# Patient Record
Sex: FEMALE | Race: AMERICAN INDIAN OR ALASKA NATIVE
[De-identification: names, ages, dates, MRNs, and addresses within clinical notes are randomized per-mention and may not be internally consistent; named-entity substitution may affect disease eponyms.]

---

## 2017-08-19 ENCOUNTER — HOSPITAL ENCOUNTER (EMERGENCY)
Dept: HOSPITAL 5 - ED | Age: 27
Discharge: HOME | End: 2017-08-19
Payer: SELF-PAY

## 2017-08-19 VITALS — SYSTOLIC BLOOD PRESSURE: 124 MMHG | DIASTOLIC BLOOD PRESSURE: 82 MMHG

## 2017-08-19 DIAGNOSIS — D25.9: Primary | ICD-10-CM

## 2017-08-19 LAB
BASOPHILS NFR BLD AUTO: 0.4 % (ref 0–1.8)
BILIRUB UR QL STRIP: (no result)
BLOOD UR QL VISUAL: (no result)
EOSINOPHIL NFR BLD AUTO: 0.9 % (ref 0–4.3)
HCT VFR BLD CALC: 37.8 % (ref 30.3–42.9)
HGB BLD-MCNC: 11.8 GM/DL (ref 10.1–14.3)
KETONES UR STRIP-MCNC: (no result) MG/DL
LEUKOCYTE ESTERASE UR QL STRIP: (no result)
MCH RBC QN AUTO: 25 PG (ref 28–32)
MCHC RBC AUTO-ENTMCNC: 31 % (ref 30–34)
MCV RBC AUTO: 80 FL (ref 79–97)
MUCOUS THREADS #/AREA URNS HPF: (no result) /HPF
NITRITE UR QL STRIP: (no result)
PH UR STRIP: 5 [PH] (ref 5–7)
PLATELET # BLD: 394 K/MM3 (ref 140–440)
PROT UR STRIP-MCNC: (no result) MG/DL
RBC # BLD AUTO: 4.73 M/MM3 (ref 3.65–5.03)
RBC #/AREA URNS HPF: 166 /HPF (ref 0–6)
UROBILINOGEN UR-MCNC: 2 MG/DL (ref ?–2)
WBC # BLD AUTO: 8.5 K/MM3 (ref 4.5–11)
WBC #/AREA URNS HPF: 1 /HPF (ref 0–6)

## 2017-08-19 PROCEDURE — 86901 BLOOD TYPING SEROLOGIC RH(D): CPT

## 2017-08-19 PROCEDURE — 86850 RBC ANTIBODY SCREEN: CPT

## 2017-08-19 PROCEDURE — 87210 SMEAR WET MOUNT SALINE/INK: CPT

## 2017-08-19 PROCEDURE — 76856 US EXAM PELVIC COMPLETE: CPT

## 2017-08-19 PROCEDURE — 81001 URINALYSIS AUTO W/SCOPE: CPT

## 2017-08-19 PROCEDURE — 36415 COLL VENOUS BLD VENIPUNCTURE: CPT

## 2017-08-19 PROCEDURE — 86900 BLOOD TYPING SEROLOGIC ABO: CPT

## 2017-08-19 PROCEDURE — 85025 COMPLETE CBC W/AUTO DIFF WBC: CPT

## 2017-08-19 PROCEDURE — 84702 CHORIONIC GONADOTROPIN TEST: CPT

## 2017-08-19 PROCEDURE — 87591 N.GONORRHOEAE DNA AMP PROB: CPT

## 2017-08-19 PROCEDURE — 76830 TRANSVAGINAL US NON-OB: CPT

## 2017-08-19 NOTE — ULTRASOUND REPORT
FINAL REPORT



PROCEDURE:  US TRANSVAGINAL



TECHNIQUE:  Real-time transvaginal sonography in multiple planes

of the pelvis was performed with image documentation. This

examination was performed without Doppler. Vascular

abnormalities, including ovarian torsion, will not be detectable

without Doppler evaluation. CPT 11912







HISTORY:  heavy vaginal bleeding 



COMPARISON:  No prior studies are available for comparison.



FINDINGS:  

UTERUS



Size: 7.5 x 4.4 x 6.0 centimeters cm.



Endometrial thickness: 17 mm.



Orientation: anteverted.



Cervix: Normal.



Fibroids/masses: 3 hypoechoic focal lesions are identified

largest measuring 2.7 centimeters located anteriorly.



RIGHT Ovary: 3.4 x 2.3 x 2.0 cm. 



Appearance: Multiple follicles are identified along the

periphery.



LEFT Ovary: 2.7 x 2.5 x 1.6 cm.



Appearance: It is suboptimally visualized due to poor window.

Left ovary also appears to contain multiple follicles along the

periphery.



Pelvic fluid: None.



Other: None.



IMPRESSION:  

Three hypoechoic lesions in the uterus consistent with fibroids.



Bilateral ovaries demonstrate multiple follicles along the

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

Clinical correlation is recommended.

## 2017-08-19 NOTE — ULTRASOUND REPORT
FINAL REPORT



PROCEDURE:  US PELVIC COMPLETE



TECHNIQUE:  Real-time transabdominal sonography in multiple

planes of pelvis was performed with image documentation. This

examination was performed without Doppler. Vascular

abnormalities, including ovarian torsion, will not be detectable

without Doppler evaluation. CPT 32563







HISTORY:  heavy vaginal bleeding 



COMPARISON:  No prior studies are available for comparison.



FINDINGS:  

UTERUS



Size: 7.5 x 4.4 x 6.0 centimeters cm.



Endometrial thickness: 17 mm.



Orientation: anteverted.



Cervix: Normal.



Fibroids/masses: 3 hypoechoic focal lesions are identified

largest measuring 2.7 centimeters located anteriorly.



RIGHT Ovary: 3.4 x 2.3 x 2.0 cm. 



Appearance: Multiple follicles are identified along the

periphery.



LEFT Ovary: 2.7 x 2.5 x 1.6 cm.



Appearance: It is suboptimally visualized due to poor window.

Left ovary also appears to contain multiple follicles along the

periphery.



Pelvic fluid: None.



Other: None.



IMPRESSION:  

Multiple focal lesions in the uterus are consistent with fibroids

largest measuring 2.7 centimeters in diameter.



Multiple ovarian follicles are identified along periphery of the

ovaries

## 2017-08-20 NOTE — EMERGENCY DEPARTMENT REPORT
Entered by CHEYENNE ANDRES, acting as scribe for FENG LAI PA.





ED Female  HPI





- General


Chief complaint: Vaginal Bleeding


Stated complaint: BLOOD CLOTS


Time Seen by Provider: 17 20:05


Source: patient


Mode of arrival: Ambulatory


Limitations: No Limitations





- History of Present Illness


Initial comments: 





26 y/o female presents to the ED c/o vaginal bleeding with clots x 2-3 months. 

Denies pain, abdominal pain, fever, chills, nausea and vomiting. Patient states 

she uses approximately 5-8 pads per day on a heavy day and has not had a normal 

period in about 4 years.  Denies any fevers chills significant abdominal pain 

nausea or vomiting.  No recent vaginal ultrasound. No alleviating or 

aggravating factors. NKDA. No significant medical and surgical history.  States 

she does not currently have a dedicated OBGYn. , P:0.


MD Complaint: vaginal bleeding


Onset/Timin


-: month(s)


Improves with: none


Worsens with: none


Are you Pregnant Now?: No


Associated Symptoms: vaginal bleeding.  denies: abdominal pain, nausea/vomiting

, fever/chills





- Related Data


 Previous Rx's











 Medication  Instructions  Recorded  Last Taken  Type


 


Ferrous Sulfate [Feosol 325 MG tab] 325 mg PO QDAY #30 tablet 17 Unknown 

Rx











 Allergies











Allergy/AdvReac Type Severity Reaction Status Date / Time


 


No Known Allergies Allergy   Unverified 17 17:04














ED Review of Systems


Comment: All other systems reviewed and negative


Constitutional: denies: chills, fever


Gastrointestinal: denies: abdominal pain, nausea, vomiting


Genitourinary: abnormal menses, other (vaginal bleeding)





ED Past Medical Hx





- Past Medical History


Previous Medical History?: No





- Surgical History


Past Surgical History?: No





- Social History


Smoking Status: Never Smoker


Substance Use Type: None





- Medications


Home Medications: 


 Home Medications











 Medication  Instructions  Recorded  Confirmed  Last Taken  Type


 


Ferrous Sulfate [Feosol 325 MG tab] 325 mg PO QDAY #30 tablet 17  Unknown 

Rx














ED Physical Exam





- General


Limitations: No Limitations


General appearance: alert, in no apparent distress





- Head


Head exam: Present: atraumatic, normocephalic, normal inspection





- Eye


Eye exam: Present: normal appearance, PERRL, EOMI.  Absent: scleral icterus, 

conjunctival injection, nystagmus, periorbital swelling, periorbital tenderness


Pupils: Present: normal accommodation





- ENT


ENT exam: Present: normal exam, normal orophraynx, mucous membranes moist, TM's 

normal bilaterally, normal external ear exam





- Neck


Neck exam: Present: normal inspection, full ROM.  Absent: tenderness, 

meningismus, lymphadenopathy, thyromegaly





- Respiratory


Respiratory exam: Present: normal lung sounds bilaterally.  Absent: respiratory 

distress, wheezes, rales, rhonchi, stridor, chest wall tenderness, accessory 

muscle use, decreased breath sounds, prolonged expiratory





- Cardiovascular


Cardiovascular Exam: Present: regular rate, normal rhythm, normal heart sounds.

  Absent: bradycardia, tachycardia, irregular rhythm, systolic murmur, 

diastolic murmur, rubs, gallop





- GI/Abdominal


GI/Abdominal exam: Present: soft, normal bowel sounds.  Absent: distended, 

tenderness, guarding, rebound, rigid





- 


External exam: Present: bleeding


Speculum exam: Present: vaginal bleeding (bloody vaginal discharge, clots)





- Extremities Exam


Extremities exam: Present: normal inspection, full ROM, normal capillary 

refill.  Absent: tenderness, pedal edema, joint swelling, calf tenderness





- Back Exam


Back exam: Present: normal inspection, full ROM.  Absent: tenderness, CVA 

tenderness (R), CVA tenderness (L), muscle spasm, paraspinal tenderness, 

vertebral tenderness, rash noted





- Neurological Exam


Neurological exam: Present: alert, oriented X3, CN II-XII intact





- Psychiatric


Psychiatric exam: Present: normal affect, normal mood





- Skin


Skin exam: Present: warm, dry, intact, normal color.  Absent: rash





- Other


Other exam information: 


Chaparrita Andres present during external, speculum and bi-manual exam





ED Course


 Vital Signs











  17





  16:56 22:46


 


Temperature 98.7 F 


 


Pulse Rate 87 89


 


Respiratory 22 18





Rate  


 


Blood Pressure 159/99 


 


Blood Pressure  124/82





[Left]  


 


O2 Sat by Pulse 99 100





Oximetry  














ED Medical Decision Making





- Lab Data


Result diagrams: 


 17 17:10








- Medical Decision Making


A/P: Menorrhagia, vaginal bleeding, uterine fibroids


1-ultrasound demonstrates uterine fibroids.  Patient on patient's complaint of 

hirsutism likely patient has PCOS will refer her to primary care and OB/GYN


2-H&H within normal limits, pt states she has been bleeding for months. labs 

within normal limits, no arterial bleeding noted on pelvic exam


3-patient educated on symptoms of uterine fibroids and I recommended that she 

follow up with OB/GYN


4- will start patient empirically on ferrous sulfate supplementation





ED Disposition


Clinical Impression: 


 Vaginal bleeding





Fibroid (bleeding) (uterine)


Qualifiers:


 Uterine leiomyoma location: unspecified location Qualified Code(s): D25.9 - 

Leiomyoma of uterus, unspecified





Disposition:  TO HOME OR SELFCARE


Is pt being admited?: No


Does the pt Need Aspirin: No


Condition: Stable


Instructions:  Menstruation (ED), Uterine Fibroids (ED)


Prescriptions: 


Ferrous Sulfate [Feosol 325 MG tab] 325 mg PO QDAY #30 tablet


Referrals: 


[facility] [Provider Group] - 3-5 Days


[facility] [Provider Group] - 3-5 Days


[facility] [Outside] - 3-5 Days


Forms:  Work/School Release Form(ED)


Time of Disposition: 22:12





This documentation as recorded by the REBEKAH gold ELIZABETH,accurately 

reflects the service I personally performed and the decisions made by me,FENG LAI PA.

## 2019-04-16 ENCOUNTER — HOSPITAL ENCOUNTER (EMERGENCY)
Dept: HOSPITAL 5 - ED | Age: 29
Discharge: HOME | End: 2019-04-16
Payer: COMMERCIAL

## 2019-04-16 VITALS — SYSTOLIC BLOOD PRESSURE: 134 MMHG | DIASTOLIC BLOOD PRESSURE: 95 MMHG

## 2019-04-16 DIAGNOSIS — F12.10: ICD-10-CM

## 2019-04-16 DIAGNOSIS — D25.9: Primary | ICD-10-CM

## 2019-04-16 LAB
BILIRUB UR QL STRIP: (no result)
BLOOD UR QL VISUAL: (no result)
BUN SERPL-MCNC: 13 MG/DL (ref 7–17)
BUN/CREAT SERPL: 16 %
CALCIUM SERPL-MCNC: 9 MG/DL (ref 8.4–10.2)
HCT VFR BLD CALC: 30.3 % (ref 30.3–42.9)
HEMOLYSIS INDEX: 7
HGB BLD-MCNC: 9.9 GM/DL (ref 10.1–14.3)
MCHC RBC AUTO-ENTMCNC: 33 % (ref 30–34)
MCV RBC AUTO: 73 FL (ref 79–97)
MUCOUS THREADS #/AREA URNS HPF: (no result) /HPF
PH UR STRIP: 5 [PH] (ref 5–7)
PLATELET # BLD: 384 K/MM3 (ref 140–440)
RBC # BLD AUTO: 4.13 M/MM3 (ref 3.65–5.03)
RBC #/AREA URNS HPF: > 182 /HPF (ref 0–6)
UROBILINOGEN UR-MCNC: 2 MG/DL (ref ?–2)
WBC #/AREA URNS HPF: 8 /HPF (ref 0–6)

## 2019-04-16 PROCEDURE — 84703 CHORIONIC GONADOTROPIN ASSAY: CPT

## 2019-04-16 PROCEDURE — 81001 URINALYSIS AUTO W/SCOPE: CPT

## 2019-04-16 PROCEDURE — 36415 COLL VENOUS BLD VENIPUNCTURE: CPT

## 2019-04-16 PROCEDURE — 76856 US EXAM PELVIC COMPLETE: CPT

## 2019-04-16 PROCEDURE — 80048 BASIC METABOLIC PNL TOTAL CA: CPT

## 2019-04-16 PROCEDURE — 81025 URINE PREGNANCY TEST: CPT

## 2019-04-16 PROCEDURE — 85027 COMPLETE CBC AUTOMATED: CPT

## 2019-04-16 PROCEDURE — 76830 TRANSVAGINAL US NON-OB: CPT

## 2019-04-16 NOTE — EMERGENCY DEPARTMENT REPORT
Chief Complaint: Vaginal Bleeding


Stated Complaint: BLEEDING/CLOTTING/CRAMPING





- HPI


History of Present Illness: 





A/C UTERINE FIBROIDS


WITH INC VAG BLEED





DEPO 1 M AGO





MSE COMPLETED





- Exam


Vital Signs: 


                                   Vital Signs











  04/16/19





  18:18


 


Temperature 98.4 F


 


Pulse Rate 93 H


 


Respiratory 18





Rate 


 


Blood Pressure 134/95


 


O2 Sat by Pulse 100





Oximetry 











MSE screening note: 


Focused history and physical exam performed.


Due to findings the following was ordered:











ED Disposition for MSE


Condition: Stable

## 2019-04-16 NOTE — EMERGENCY DEPARTMENT REPORT
ED Abdominal Pain HPI





- General


Chief Complaint: Vaginal Bleeding


Stated Complaint: BLEEDING/CLOTTING/CRAMPING


Time Seen by Provider: 04/16/19 18:21


Source: patient


Mode of arrival: Ambulatory


Limitations: No Limitations





- History of Present Illness


Initial Comments: 





30 YO FEMALE WITH HX FIBROIDS WITH DUB


SAW OB LAST 1 YEAR AGO


NO PAIN





AMBULATORY 


NONTOXIC





- Related Data


                                  Previous Rx's











 Medication  Instructions  Recorded  Last Taken  Type


 


Ferrous Sulfate [Feosol 325 MG tab] 325 mg PO QDAY #30 tablet 08/19/17 Unknown 

Rx











                                    Allergies











Allergy/AdvReac Type Severity Reaction Status Date / Time


 


No Known Allergies Allergy   Unverified 08/19/17 17:04














ED Review of Systems


ROS: 


Stated complaint: BLEEDING/CLOTTING/CRAMPING


Other details as noted in HPI





Comment: All other systems reviewed and negative





ED Past Medical Hx





- Past Medical History


Additional medical history: FIBROIDS





- Surgical History


Past Surgical History?: No





- Family History


Family history: no significant





- Social History


Smoking Status: Never Smoker


Substance Use Type: Marijuana





- Medications


Home Medications: 


                                Home Medications











 Medication  Instructions  Recorded  Confirmed  Last Taken  Type


 


Ferrous Sulfate [Feosol 325 MG tab] 325 mg PO QDAY #30 tablet 08/19/17  Unknown 

Rx














ED Physical Exam





- General


Limitations: No Limitations


General appearance: alert





- Head


Head exam: Present: atraumatic, normocephalic





- Eye


Eye exam: Present: normal appearance, PERRL





- ENT


ENT exam: Present: mucous membranes moist





- Neck


Neck exam: Present: normal inspection





- Respiratory


Respiratory exam: Present: normal lung sounds bilaterally





- Cardiovascular


Cardiovascular Exam: Present: regular rate





- GI/Abdominal


GI/Abdominal exam: Present: soft, normal bowel sounds





- Rectal


Rectal exam: Present: deferred





- Extremities Exam


Extremities exam: Present: normal inspection, full ROM





- Back Exam


Back exam: Present: normal inspection, full ROM





- Neurological Exam


Neurological exam: Present: alert, oriented X3





- Psychiatric


Psychiatric exam: Present: normal affect, normal mood





- Skin


Skin exam: Present: warm, dry, intact





ED Course


                                   Vital Signs











  04/16/19





  18:18


 


Temperature 98.4 F


 


Pulse Rate 93 H


 


Respiratory 18





Rate 


 


Blood Pressure 134/95


 


O2 Sat by Pulse 100





Oximetry 














ED Medical Decision Making





- Lab Data


Result diagrams: 


                                 04/16/19 20:39





                                 04/16/19 20:39





- Medical Decision Making








                                   Lab Results











  04/16/19 04/16/19 04/16/19 Range/Units





  18:46 20:39 20:39 


 


WBC   8.2   (4.5-11.0)  K/mm3


 


RBC   4.13   (3.65-5.03)  M/mm3


 


Hgb   9.9 L   (10.1-14.3)  gm/dl


 


Hct   30.3   (30.3-42.9)  %


 


MCV   73 L   (79-97)  fl


 


MCH   24 L   (28-32)  pg


 


MCHC   33   (30-34)  %


 


RDW   16.0 H   (13.2-15.2)  %


 


Plt Count   384   (140-440)  K/mm3


 


Sodium    139  (137-145)  mmol/L


 


Potassium    3.9  (3.6-5.0)  mmol/L


 


Chloride    105.8  ()  mmol/L


 


Carbon Dioxide    22  (22-30)  mmol/L


 


Anion Gap    15  mmol/L


 


BUN    13  (7-17)  mg/dL


 


Creatinine    0.8  (0.7-1.2)  mg/dL


 


Estimated GFR    > 60  ml/min


 


BUN/Creatinine Ratio    16  %


 


Glucose    136 H  ()  mg/dL


 


Calcium    9.0  (8.4-10.2)  mg/dL


 


HCG, Qual     (Negative)  


 


Urine Color  Yellow    (Yellow)  


 


Urine Turbidity  Slightly-cloudy    (Clear)  


 


Urine pH  5.0    (5.0-7.0)  


 


Ur Specific Gravity  1.042 H    (1.003-1.030)  


 


Urine Protein  100 mg/dl    (Negative)  mg/dL


 


Urine Glucose (UA)  Neg    (Negative)  mg/dL


 


Urine Ketones  Tr    (Negative)  mg/dL


 


Urine Blood  Lg    (Negative)  


 


Urine Nitrite  Neg    (Negative)  


 


Ur Reducing Substances  Not Reportable    


 


Urine Bilirubin  Neg    (Negative)  


 


Urine Ictotest  Not Reportable    


 


Urine Urobilinogen  2.0    (<2.0)  mg/dL


 


Ur Leukocyte Esterase  Tr    (Negative)  


 


Urine WBC (Auto)  8.0 H    (0.0-6.0)  /HPF


 


Urine RBC (Auto)  > 182.0    (0.0-6.0)  /HPF


 


U Epithel Cells (Auto)  1.0    (0-13.0)  /HPF


 


Urine Mucus  3+    /HPF


 


Urine HCG, Qual  Negative    (Negative)  














  04/16/19 Range/Units





  20:39 


 


WBC   (4.5-11.0)  K/mm3


 


RBC   (3.65-5.03)  M/mm3


 


Hgb   (10.1-14.3)  gm/dl


 


Hct   (30.3-42.9)  %


 


MCV   (79-97)  fl


 


MCH   (28-32)  pg


 


MCHC   (30-34)  %


 


RDW   (13.2-15.2)  %


 


Plt Count   (140-440)  K/mm3


 


Sodium   (137-145)  mmol/L


 


Potassium   (3.6-5.0)  mmol/L


 


Chloride   ()  mmol/L


 


Carbon Dioxide   (22-30)  mmol/L


 


Anion Gap   mmol/L


 


BUN   (7-17)  mg/dL


 


Creatinine   (0.7-1.2)  mg/dL


 


Estimated GFR   ml/min


 


BUN/Creatinine Ratio   %


 


Glucose   ()  mg/dL


 


Calcium   (8.4-10.2)  mg/dL


 


HCG, Qual  Negative  (Negative)  


 


Urine Color   (Yellow)  


 


Urine Turbidity   (Clear)  


 


Urine pH   (5.0-7.0)  


 


Ur Specific Gravity   (1.003-1.030)  


 


Urine Protein   (Negative)  mg/dL


 


Urine Glucose (UA)   (Negative)  mg/dL


 


Urine Ketones   (Negative)  mg/dL


 


Urine Blood   (Negative)  


 


Urine Nitrite   (Negative)  


 


Ur Reducing Substances   


 


Urine Bilirubin   (Negative)  


 


Urine Ictotest   


 


Urine Urobilinogen   (<2.0)  mg/dL


 


Ur Leukocyte Esterase   (Negative)  


 


Urine WBC (Auto)   (0.0-6.0)  /HPF


 


Urine RBC (Auto)   (0.0-6.0)  /HPF


 


U Epithel Cells (Auto)   (0-13.0)  /HPF


 


Urine Mucus   /HPF


 


Urine HCG, Qual   (Negative)  











                                   Vital Signs











  04/16/19





  18:18


 


Temperature 98.4 F


 


Pulse Rate 93 H


 


Respiratory 18





Rate 


 


Blood Pressure 134/95


 


O2 Sat by Pulse 100





Oximetry 








DISCUSSED FINDINGS WITH PT


DISCUSSED OBGYN AND HER NEED TO FOLLOW UP GIVEN HER DUB AND FIBROID WITH DESIRE 

TO GET PREGNANT


SHE VERBALIZES UNDERSTANDING





DC HOME W REFERRAL


Critical care attestation.: 


If time is entered above; I have spent that time in minutes in the direct care 

of this critically ill patient, excluding procedure time.








ED Disposition


Clinical Impression: 


 DUB (dysfunctional uterine bleeding), Fibroid, uterine





Disposition: DC-01 TO HOME OR SELFCARE


Is pt being admited?: No


Does the pt Need Aspirin: No


Condition: Stable


Instructions:  Uterine Fibroids (ED)


Additional Instructions: 


follow up obgyn





motrin or tylenol for pain 








Referrals: 


PRIMARY MD DO [Primary Care Provider] - 3-5 Days


ADRIENNE CARRENO MD [Staff Physician] - 3-5 Days


Time of Disposition: 21:10

## 2019-04-16 NOTE — ULTRASOUND REPORT
PROCEDURE:  US PELVIC COMPLETE 

 

TECHNIQUE:  Transvaginal sonography was then performed to better evaluate the structures and/or abnor
malities described below with image documentation. Grayscale, color flow Doppler imaging, and velocit
y spectral waveform analysis of the ovaries was employed (duplex imaging).  

 

HISTORY: DUB HX FIBROIDS 

 

COMPARISONS:  None . 

 

FINDINGS: 

 

UTERUS 

Size: 7.9 x 4.6 x 6.4 cm. 

Endometrial thickness: 7.8 mm. 

Orientation: anteverted. 

Cervix: Normal. 

Fibroids/masses: There is an intramural fibroid in the left side of the uterus measuring 2.4 cm.. 

 

RIGHT Ovary: 3 x 1.8 x 2.3 cm.   

Appearance: Normal. 

Doppler images: Normal spectral waveforms and color flow images of the arterial inflow and venous out
flow.. 

 

LEFT Ovary: 2.3 x 1.3 x 1.6 cm. 

Appearance: Normal. 

Doppler images: Normal spectral waveforms and color flow images of the arterial inflow and venous out
flow.. 

 

Pelvic fluid: There is minimal free pelvic fluid.. 

 

IMPRESSION:   

 

There is an intramural fibroid in the left side of the uterus measuring 2.4 cm.. 

 

There is no ovarian torsion or mass. 

 

There is minimal free pelvic fluid.. 

 

This document is electronically signed by Eugenio Coats MD., April 16 2019 08:43:13 PM ET

## 2019-04-16 NOTE — ULTRASOUND REPORT
PROCEDURE:  US PELVIC COMPLETE 

 

TECHNIQUE:  Real-time transabdominal sonography in multiple planes of the pelvis was performed. The p
elvic structures were not optimally visualized. Transvaginal sonography was then performed to better 
evaluate the structures and/or abnormalities described below with image documentation. Grayscale, col
or flow Doppler imaging, and velocity spectral waveform analysis of the ovaries was employed (duplex 
imaging).  

 

HISTORY: DUB HX FIBROIDS 

 

COMPARISONS:  None . 

 

FINDINGS: 

 

UTERUS 

Size: 7.9 x 4.6 x 6.4 cm. 

Endometrial thickness: 7.8 mm. 

Orientation: anteverted. 

Cervix: Normal. 

Fibroids/masses: There is an intramural fibroid in the left side of the uterus measuring 2.4 cm.. 

 

RIGHT Ovary: 3 x 1.8 x 2.3 cm.   

Appearance: Normal. 

Doppler images: Normal spectral waveforms and color flow images of the arterial inflow and venous out
flow.. 

 

LEFT Ovary: 2.3 x 1.3 x 1.6 cm. 

Appearance: Normal. 

Doppler images: Normal spectral waveforms and color flow images of the arterial inflow and venous out
flow.. 

 

Pelvic fluid: There is minimal free pelvic fluid.. 

 

IMPRESSION:   

 

There is an intramural fibroid in the left side of the uterus measuring 2.4 cm.. 

 

There is no ovarian torsion or mass. 

 

There is minimal free pelvic fluid.. 

 

This document is electronically signed by Eugenio Coats MD., April 16 2019 08:42:37 PM ET